# Patient Record
(demographics unavailable — no encounter records)

---

## 2025-02-18 NOTE — REASON FOR VISIT
[Behavioral Health Urgent Care Assessment] : a behavioral health urgent care assessment [Patient] : patient [Self] : alone [Mother] : with mother [TextBox_17] : safety assessment

## 2025-02-18 NOTE — PLAN
[Patient] : patient [Family] : family [None on Record] : none on record [TextBox_9] : referral for outpatient therapy [TextBox_11] : deferred [TextBox_13] : Safety plan completed with patient using the [facility] Safety Plan."  The Safety Plan is a best practice recommendation by the [facility].  Safety planning reviewed with patient & family. Advised to secure all potentially dangerous items from home, including but not limited to sharp objects, weapons, prescription and non-prescription medications, and other lethal means out of patient's reach. They deny having any firearms at home. Parent agreed. Parent and patient advised to visit the nearest ED or call 911 for any worsening symptoms or if safety concerns arise. [id number] provided. All involved verbalized understanding, See note in chart. [TextBox_26] : self

## 2025-02-18 NOTE — PHYSICAL EXAM
[Cooperative] : cooperative [Euthymic] : euthymic [Full] : full [Overproductive] : overproductive [Linear/Goal Directed] : linear/goal directed [Average] : average [WNL] : within normal limits

## 2025-02-18 NOTE — DISCUSSION/SUMMARY
[Low acute suicide risk] : Low acute suicide risk [Yes] : Safety Plan completed/updated (for individuals at risk): Yes [FreeTextEntry1] : Pt is a 18 yo F, in 12th grade at [employer], presenting with depressed mood for 2 months, feeling down more often than not most of the day. Endorsing anhedonia, decreased appetite, hypersomnia, and fleeting passive suicidal ideation. denies active suicidal ideation, intent, or plan. pt also endorses feeling anxious, worrying all the time, and intermittent anxiety attacks. Pt does not appear to have low mood during interview, presents cheery and bright and Is future oriented. Safety plan completed. Mother denies acute safety concerns. In my medical opinion the pt is not an acute risk of harm to self or others and does not warrant psychiatric hospitalization.

## 2025-02-18 NOTE — RISK ASSESSMENT
[Clinical Interview] : Clinical Interview [Yes] : Yes [No] : No [Mood disorder] : mood disorder [Depressed mood/Anhedonia] : depressed mood/anhedonia [Non-compliant or not receiving treatment] : non-compliant or not receiving treatment [None known] : None known [Supportive social network of family or friends] : supportive social network of family or friends [Cultural, spiritual and/or moral attitudes against suicide] : cultural, spiritual and/or moral attitudes against suicide [Engaged in work or school] : engaged in work or school [None in the patient's lifetime] : None in the patient's lifetime [None Known] : none known [No known risk factors] : No known risk factors [Residential stability] : residential stability [Sobriety] : sobriety

## 2025-02-18 NOTE — SOCIAL HISTORY
[No Known Substance Use] : no known substance use [FreeTextEntry1] :  Currently lives with mom and younger sister (15 yo)

## 2025-02-18 NOTE — HISTORY OF PRESENT ILLNESS
[FreeTextEntry1] : Pt is a 16 yo F, 11 th grader at [employer] ([place]), presents today at mother's request. Has no PPHx, no PMHx, no Hx of suicide attempts or self-injurious behavior. Was at [facility] 1 year ago, for symptoms of anxiety and depression, was referred to services but did not follow up with the care coordinator. Today pt states she has been feeling down and depressed for about 2 months, and also endorses feeling little pleasure in doing things. States she does not have the energy to go out with family anymore. She states on Saturday 2/8, she was home alone and randomly started crying, had thoughts of killing herself, either by "taking pills" or "cut herself" to end her life, but did not follow through; instead called her older sister to tell her how she was feeling. Mom proceeded to pick her up from the house. Pt is unable to identify the trigger for this episode. She denies a history of abuse or trauma. When asked about current suicidal ideation, she endorses fleeting thoughts of wanting to be dead, but currently has no plan or intent on acting upon thoughts. She is future oriented, wants to become an ultrasound technician and later and Obstetrician.  States she is doing well in school, academically. She states she was evaluated at school for a learning disability, and was placed in a smaller sized classroom as a result. She states she still has trouble focusing in school, is easily distracted, and has multiple thoughts at a time, despite being in a smaller class setting.  She describes worrying a lot about her future and endorses panicking at times over these thoughts, which impede her daily function. She also endorses oversleeping, she states she sleeps from 3p to 5am and still wakes up feeling tired. Patient denies manic symptoms including elevated mood, increased irritability, mood lability, distractibility, grandiosity, pressured speech, increase in goal-directed activity, or decreased need for sleep. Patient denies any psychotic symptoms including paranoia, ideas of reference, thought insertion/broadcasting, or auditory/visual/olfactory/tactile/gustatory hallucinations. Per mother's collateral, pt has been feeling depressed for 1 year, reached out to [facility] but did not follow up with services because she "felt pt did not need services."  Mother also states having received phone call from her adult daughter explaining that pt had called her crying and endorsing thoughts of killing herself. Mom also disclosed that pt's father is not involved in the family and that pt states it does not bother her that he is not around. [FreeTextEntry3] : n/a